# Patient Record
Sex: MALE | Race: WHITE
[De-identification: names, ages, dates, MRNs, and addresses within clinical notes are randomized per-mention and may not be internally consistent; named-entity substitution may affect disease eponyms.]

---

## 2020-11-11 ENCOUNTER — APPOINTMENT (OUTPATIENT)
Dept: UROLOGY | Facility: CLINIC | Age: 30
End: 2020-11-11

## 2021-03-08 ENCOUNTER — APPOINTMENT (OUTPATIENT)
Dept: HEMATOLOGY ONCOLOGY | Facility: CLINIC | Age: 31
End: 2021-03-08
Payer: COMMERCIAL

## 2021-03-08 ENCOUNTER — LABORATORY RESULT (OUTPATIENT)
Age: 31
End: 2021-03-08

## 2021-03-08 VITALS
HEIGHT: 66 IN | TEMPERATURE: 97.7 F | HEART RATE: 95 BPM | WEIGHT: 201 LBS | SYSTOLIC BLOOD PRESSURE: 136 MMHG | DIASTOLIC BLOOD PRESSURE: 84 MMHG | BODY MASS INDEX: 32.3 KG/M2

## 2021-03-08 DIAGNOSIS — K90.0 CELIAC DISEASE: ICD-10-CM

## 2021-03-08 DIAGNOSIS — Z87.19 PERSONAL HISTORY OF OTHER DISEASES OF THE DIGESTIVE SYSTEM: ICD-10-CM

## 2021-03-08 DIAGNOSIS — Z78.9 OTHER SPECIFIED HEALTH STATUS: ICD-10-CM

## 2021-03-08 DIAGNOSIS — Z80.43 FAMILY HISTORY OF MALIGNANT NEOPLASM OF TESTIS: ICD-10-CM

## 2021-03-08 PROCEDURE — 99204 OFFICE O/P NEW MOD 45 MIN: CPT

## 2021-03-09 ENCOUNTER — LABORATORY RESULT (OUTPATIENT)
Age: 31
End: 2021-03-09

## 2021-03-09 PROBLEM — Z80.43 FAMILY HISTORY OF MALIGNANT NEOPLASM OF TESTIS: Status: ACTIVE | Noted: 2021-03-09

## 2021-03-09 PROBLEM — Z78.9 NON-SMOKER: Status: ACTIVE | Noted: 2021-03-09

## 2021-03-09 PROBLEM — Z87.19 HISTORY OF GASTROESOPHAGEAL REFLUX (GERD): Status: RESOLVED | Noted: 2021-03-09 | Resolved: 2021-03-09

## 2021-03-09 PROBLEM — K90.0 ADULT CELIAC DISEASE: Status: RESOLVED | Noted: 2021-03-09 | Resolved: 2021-03-09

## 2021-03-09 LAB
HCT VFR BLD CALC: 52 %
HGB BLD-MCNC: 17.6 G/DL
LDH SERPL-CCNC: 194 U/L
MCHC RBC-ENTMCNC: 26.7 PG
MCHC RBC-ENTMCNC: 33.8 G/DL
MCV RBC AUTO: 78.8 FL
PLATELET # BLD AUTO: 266 K/UL
PMV BLD: 8.9 FL
RBC # BLD: 6.6 M/UL
RBC # FLD: 12.6 %
WBC # FLD AUTO: 6.89 K/UL

## 2021-03-09 RX ORDER — MELOXICAM 7.5 MG/1
7.5 TABLET ORAL
Qty: 15 | Refills: 0 | Status: DISCONTINUED | COMMUNITY
Start: 2020-10-15

## 2021-03-09 RX ORDER — OMEPRAZOLE 40 MG/1
40 CAPSULE, DELAYED RELEASE ORAL
Qty: 30 | Refills: 0 | Status: ACTIVE | COMMUNITY
Start: 2020-11-13

## 2021-03-09 RX ORDER — LEVOFLOXACIN 500 MG/1
500 TABLET, FILM COATED ORAL
Qty: 21 | Refills: 0 | Status: DISCONTINUED | COMMUNITY
Start: 2020-11-17

## 2021-03-09 RX ORDER — DOXYCYCLINE HYCLATE 100 MG/1
100 CAPSULE ORAL
Qty: 42 | Refills: 0 | Status: DISCONTINUED | COMMUNITY
Start: 2020-10-15

## 2021-03-11 LAB — EPO SERPL-MCNC: 11.9 MIU/ML

## 2021-03-11 NOTE — REASON FOR VISIT
[Initial Consultation] : an initial consultation for [FreeTextEntry2] : Referred for evaluation of erythrocytosis

## 2021-03-11 NOTE — ASSESSMENT
[FreeTextEntry1] : 30 y o m presented for evaluation of erythrocytosis .\par \par #Erythrocytosis with blood work showing Hb of 17 and HCT of 51\par \par I have prepared the note after I reviewed the previous notes, reviewed the laboratory studies done prior to pt's visit  today and have communicated those to the patient.\par \par \par --Discussed with pt in detail about differential and causes of erythrocytosis . He was told that we will repeat blood work today.\par --will get cbc , ldh .\par --will also r/o MPN such as polycythemia vera , and will check for JAK2 , CALR and MPL.\par --he was told to get evaluation by pulmonary and to get sleep studies as DONNA is one of the reasons for secondary polycythemia .\par --he is recommended to f/u with [PMD for health care maintenance .\par --RTC in 3 wks to discuss results .\par \par Pt was seen and examined with Dr Wilson who agrees with plan of care .

## 2021-03-11 NOTE — CONSULT LETTER
[Dear  ___] : Dear  [unfilled], [Consult Letter:] : I had the pleasure of evaluating your patient, [unfilled]. [Please see my note below.] : Please see my note below. [Consult Closing:] : Thank you very much for allowing me to participate in the care of this patient.  If you have any questions, please do not hesitate to contact me. [Sincerely,] : Sincerely, [FreeTextEntry3] : Rose Wilson MD\par Professor [employer]\par Bill/Jurgen\par Attending Physician\par [facility] [place]\par [phone]\par \par

## 2021-03-11 NOTE — HISTORY OF PRESENT ILLNESS
[de-identified] : 30 y o M with PMH of celiac disease and gerd was referred to us by Dr Marmolejo for evaluation of erythrocytosis . Pt reports that his brother was recently diagnosed with testicular cancer and pt was very upset with his diagnosis so went to see PMD for evaluation . PMD referred pt for blood work . CBC showed Hb of 17.2 with Hct 51, rbc count 6.42 , MCV 79 , WBC 6 .42 and platelets 245 . CMP showed normal chemistry . Pt then had repeat blood work  that again demonstrated Hb of 17 with Hct of 51 . Pt denies taking any OTC supplements or hormones . Denies any family history polycythemia . \par Pt denies h/o smoking . Denies itching or pruritis \par He reports that he snores at night and his girl friend has noticed apnea episodes at night . He is very active otherwise , takes no medications at home .

## 2021-03-11 NOTE — END OF VISIT
[] : Fellow [FreeTextEntry3] : erythrocytosis likley secondary. Proceed with above w/u. Pt should get pulm eval for sleep apnea

## 2021-03-12 LAB — JAK2 GENE MUT ANL BLD/T: NORMAL

## 2021-04-05 ENCOUNTER — APPOINTMENT (OUTPATIENT)
Dept: HEMATOLOGY ONCOLOGY | Facility: CLINIC | Age: 31
End: 2021-04-05
Payer: COMMERCIAL

## 2021-04-05 ENCOUNTER — LABORATORY RESULT (OUTPATIENT)
Age: 31
End: 2021-04-05

## 2021-04-05 VITALS
BODY MASS INDEX: 32.78 KG/M2 | SYSTOLIC BLOOD PRESSURE: 135 MMHG | HEART RATE: 83 BPM | HEIGHT: 66 IN | DIASTOLIC BLOOD PRESSURE: 77 MMHG | WEIGHT: 204 LBS | TEMPERATURE: 98.7 F

## 2021-04-05 LAB
HCT VFR BLD CALC: 51.7 %
HGB BLD-MCNC: 17.3 G/DL
MCHC RBC-ENTMCNC: 26.4 PG
MCHC RBC-ENTMCNC: 33.5 G/DL
MCV RBC AUTO: 78.8 FL
PLATELET # BLD AUTO: 270 K/UL
PMV BLD: 9 FL
RBC # BLD: 6.56 M/UL
RBC # FLD: 12.7 %
WBC # FLD AUTO: 6.9 K/UL

## 2021-04-05 PROCEDURE — 99213 OFFICE O/P EST LOW 20 MIN: CPT

## 2021-04-05 NOTE — HISTORY OF PRESENT ILLNESS
[de-identified] : 4/5/21\par Pt is here for follow up.\par No headaches, no fever, no itching.\par He is here to discuss lab results [de-identified] : 30 y o M with PMH of celiac disease and gerd was referred to us by Dr Marmolejo for evaluation of erythrocytosis . Pt reports that his brother was recently diagnosed with testicular cancer and pt was very upset with his diagnosis so went to see PMD for evaluation . PMD referred pt for blood work . CBC showed Hb of 17.2 with Hct 51, rbc count 6.42 , MCV 79 , WBC 6 .42 and platelets 245 . CMP showed normal chemistry . Pt then had repeat blood work  that again demonstrated Hb of 17 with Hct of 51 . Pt denies taking any OTC supplements or hormones . Denies any family history polycythemia . \par Pt denies h/o smoking . Denies itching or pruritis \par He reports that he snores at night and his girl friend has noticed apnea episodes at night . He is very active otherwise , takes no medications at home .

## 2021-04-05 NOTE — REASON FOR VISIT
[Follow-Up Visit] : a follow-up visit for [FreeTextEntry2] : Referred for evaluation of erythrocytosis

## 2021-04-05 NOTE — CONSULT LETTER
[Dear  ___] : Dear  [unfilled], [Consult Letter:] : I had the pleasure of evaluating your patient, [unfilled]. [Please see my note below.] : Please see my note below. [Consult Closing:] : Thank you very much for allowing me to participate in the care of this patient.  If you have any questions, please do not hesitate to contact me. [Sincerely,] : Sincerely, [FreeTextEntry3] : Rose Wilson MD\par Professor [employer]\par Bill/Jurgen\par Attending Physician\par [facility] [place]\par [phone]\par \par

## 2021-04-05 NOTE — ASSESSMENT
[FreeTextEntry1] : 30 y o m presented for evaluation of erythrocytosis .\par \par #Erythrocytosis with blood work showing Hb of 17 and HCT of 52%. Pt has microcytosis\par \par \par --Discussed with pt in detail about differential and causes of erythrocytosis . He was told that we will repeat blood work today.\par -Discussed that based on lab results no e/o myeloproliferative disorder.\par  Polycythemia likely secondary. Pt advised to get eval to R/O sleep apnea.\par Will phlebotomize today to keep Hct less than 50%.\par \par I have prepared the note after I reviewed the previous notes, reviewed the laboratory studies done prior to pt's visit  today and have communicated those to the patient.\par \par \par --RTC in 3 wks to repeat CBC\par \par

## 2021-04-06 LAB
FERRITIN SERPL-MCNC: 135 NG/ML
IRON SATN MFR SERPL: 18 %
IRON SERPL-MCNC: 61 UG/DL
TIBC SERPL-MCNC: 339 UG/DL
UIBC SERPL-MCNC: 278 UG/DL

## 2021-04-09 LAB
HGB A MFR BLD: 97.6 %
HGB A2 MFR BLD: 2.4 %
HGB FRACT BLD-IMP: NORMAL

## 2021-04-26 ENCOUNTER — APPOINTMENT (OUTPATIENT)
Dept: HEMATOLOGY ONCOLOGY | Facility: CLINIC | Age: 31
End: 2021-04-26
Payer: COMMERCIAL

## 2021-04-26 ENCOUNTER — LABORATORY RESULT (OUTPATIENT)
Age: 31
End: 2021-04-26

## 2021-04-26 ENCOUNTER — APPOINTMENT (OUTPATIENT)
Dept: INFUSION THERAPY | Facility: CLINIC | Age: 31
End: 2021-04-26
Payer: COMMERCIAL

## 2021-04-26 VITALS
DIASTOLIC BLOOD PRESSURE: 88 MMHG | SYSTOLIC BLOOD PRESSURE: 130 MMHG | HEART RATE: 94 BPM | BODY MASS INDEX: 32.78 KG/M2 | WEIGHT: 204 LBS | TEMPERATURE: 98.7 F | HEIGHT: 66 IN

## 2021-04-26 DIAGNOSIS — Z00.00 ENCOUNTER FOR GENERAL ADULT MEDICAL EXAMINATION W/OUT ABNORMAL FINDINGS: ICD-10-CM

## 2021-04-26 LAB
HCT VFR BLD CALC: 48.7 %
HGB BLD-MCNC: 16.4 G/DL
MCHC RBC-ENTMCNC: 26.7 PG
MCHC RBC-ENTMCNC: 33.7 G/DL
MCV RBC AUTO: 79.3 FL
PLATELET # BLD AUTO: 277 K/UL
PMV BLD: 8.8 FL
RBC # BLD: 6.14 M/UL
RBC # FLD: 13 %
WBC # FLD AUTO: 8.04 K/UL

## 2021-04-26 PROCEDURE — 99213 OFFICE O/P EST LOW 20 MIN: CPT

## 2021-04-30 ENCOUNTER — APPOINTMENT (OUTPATIENT)
Dept: PULMONOLOGY | Facility: CLINIC | Age: 31
End: 2021-04-30
Payer: COMMERCIAL

## 2021-04-30 VITALS
HEIGHT: 66 IN | RESPIRATION RATE: 14 BRPM | SYSTOLIC BLOOD PRESSURE: 118 MMHG | BODY MASS INDEX: 32.78 KG/M2 | WEIGHT: 204 LBS | DIASTOLIC BLOOD PRESSURE: 80 MMHG | HEART RATE: 78 BPM | OXYGEN SATURATION: 98 %

## 2021-04-30 PROCEDURE — 99203 OFFICE O/P NEW LOW 30 MIN: CPT

## 2021-04-30 PROCEDURE — 99072 ADDL SUPL MATRL&STAF TM PHE: CPT

## 2021-04-30 NOTE — ASSESSMENT
[FreeTextEntry1] : 30 y o m presented for evaluation of erythrocytosis .\par \par #Erythrocytosis \par \par \par --Discussed with pt in detail about differential and causes of erythrocytosis . \par -Discussed that based on lab results no e/o myeloproliferative disorder.\par  Polycythemia likely secondary. Pt advised to get eval to R/O sleep apnea.\par CBC ok today, hold phlebotomy today\par RTC in 3 wks to repeat CBC\par Will phlebotomize only if   Hct > 50%.\par Will continue to monitor closely\par I have prepared the note after I reviewed the previous notes, reviewed the laboratory studies done prior to pt's visit  today and have communicated those to the patient.\par \par Follow up with me in 3 M\par \par \par

## 2021-04-30 NOTE — HISTORY OF PRESENT ILLNESS
[Initial Evaluation] : an initial evaluation of [Excessive Sleepiness-Day] : excessive daytime sleepiness [Witnessed Apnea] : no witnessed apnea during sleep [Witnessed Gasping] : no witnessed gasping during sleep [Snoring] : snoring [Unrefreshing Sleep] : unrefreshing sleep [Currently Experiencing] : The patient is currently experiencing symptoms. [Gradual] : gradual [Moderate] : moderate in severity [Worsening] : worsening [Sleeping on Back] : sleeping on back [Fatigue] : fatigue [Weight Gain] : weight gain [Shortness of Breath] : no shortness of breath [None] : The patient is not currently being treated for this problem

## 2021-04-30 NOTE — CONSULT LETTER
[Dear  ___] : Dear  [unfilled], [Consult Letter:] : I had the pleasure of evaluating your patient, [unfilled]. [Please see my note below.] : Please see my note below. [Consult Closing:] : Thank you very much for allowing me to participate in the care of this patient.  If you have any questions, please do not hesitate to contact me. [Sincerely,] : Sincerely, [FreeTextEntry3] : Rose Wilson MD\par Professor [employer]\par Bill/Jurgen\par Attending Physician\par [facility] [place]\par [phone]\par \par

## 2021-04-30 NOTE — HISTORY OF PRESENT ILLNESS
[de-identified] : 4/5/21\par Pt is here for follow up.\par No headaches, no fever, no itching.\par He is here to discuss lab results\par \par 4/26/21\par Pt is here for follow up.\par No new complaints. has consult with Pulmonary scheduled [de-identified] : 30 y o M with PMH of celiac disease and gerd was referred to us by Dr Marmolejo for evaluation of erythrocytosis . Pt reports that his brother was recently diagnosed with testicular cancer and pt was very upset with his diagnosis so went to see PMD for evaluation . PMD referred pt for blood work . CBC showed Hb of 17.2 with Hct 51, rbc count 6.42 , MCV 79 , WBC 6 .42 and platelets 245 . CMP showed normal chemistry . Pt then had repeat blood work  that again demonstrated Hb of 17 with Hct of 51 . Pt denies taking any OTC supplements or hormones . Denies any family history polycythemia . \par Pt denies h/o smoking . Denies itching or pruritis \par He reports that he snores at night and his girl friend has noticed apnea episodes at night . He is very active otherwise , takes no medications at home .

## 2021-05-17 ENCOUNTER — LABORATORY RESULT (OUTPATIENT)
Age: 31
End: 2021-05-17

## 2021-05-17 ENCOUNTER — OUTPATIENT (OUTPATIENT)
Dept: OUTPATIENT SERVICES | Facility: HOSPITAL | Age: 31
LOS: 1 days | Discharge: HOME | End: 2021-05-17

## 2021-05-17 ENCOUNTER — APPOINTMENT (OUTPATIENT)
Dept: HEMATOLOGY ONCOLOGY | Facility: CLINIC | Age: 31
End: 2021-05-17

## 2021-05-17 DIAGNOSIS — D75.1 SECONDARY POLYCYTHEMIA: ICD-10-CM

## 2021-05-17 LAB
HCT VFR BLD CALC: 47.7 %
HGB BLD-MCNC: 16.2 G/DL
MCHC RBC-ENTMCNC: 26.9 PG
MCHC RBC-ENTMCNC: 34 G/DL
MCV RBC AUTO: 79.2 FL
PLATELET # BLD AUTO: 309 K/UL
PMV BLD: 8.6 FL
RBC # BLD: 6.02 M/UL
RBC # FLD: 12.5 %
WBC # FLD AUTO: 8.47 K/UL

## 2021-05-26 ENCOUNTER — OUTPATIENT (OUTPATIENT)
Dept: OUTPATIENT SERVICES | Facility: HOSPITAL | Age: 31
LOS: 1 days | Discharge: HOME | End: 2021-05-26
Payer: COMMERCIAL

## 2021-05-26 PROCEDURE — 95806 SLEEP STUDY UNATT&RESP EFFT: CPT | Mod: 26

## 2021-05-27 DIAGNOSIS — G47.33 OBSTRUCTIVE SLEEP APNEA (ADULT) (PEDIATRIC): ICD-10-CM

## 2021-06-11 ENCOUNTER — APPOINTMENT (OUTPATIENT)
Age: 31
End: 2021-06-11
Payer: COMMERCIAL

## 2021-06-11 DIAGNOSIS — G47.33 OBSTRUCTIVE SLEEP APNEA (ADULT) (PEDIATRIC): ICD-10-CM

## 2021-06-11 PROCEDURE — 99212 OFFICE O/P EST SF 10 MIN: CPT | Mod: 95

## 2021-06-11 NOTE — REASON FOR VISIT
[Medical Office: ([facility])___] : at the medical office located in  [Follow-Up] : a follow-up visit [Sleep Apnea] : sleep apnea

## 2021-06-21 ENCOUNTER — OUTPATIENT (OUTPATIENT)
Dept: OUTPATIENT SERVICES | Facility: HOSPITAL | Age: 31
LOS: 1 days | Discharge: HOME | End: 2021-06-21

## 2021-06-21 ENCOUNTER — APPOINTMENT (OUTPATIENT)
Dept: HEMATOLOGY ONCOLOGY | Facility: CLINIC | Age: 31
End: 2021-06-21
Payer: COMMERCIAL

## 2021-06-21 ENCOUNTER — LABORATORY RESULT (OUTPATIENT)
Age: 31
End: 2021-06-21

## 2021-06-21 VITALS
RESPIRATION RATE: 16 BRPM | HEART RATE: 72 BPM | WEIGHT: 201 LBS | BODY MASS INDEX: 32.3 KG/M2 | SYSTOLIC BLOOD PRESSURE: 129 MMHG | HEIGHT: 66 IN | TEMPERATURE: 97.8 F | DIASTOLIC BLOOD PRESSURE: 72 MMHG

## 2021-06-21 LAB
HCT VFR BLD CALC: 49.7 %
HGB BLD-MCNC: 17.1 G/DL
MCHC RBC-ENTMCNC: 26.6 PG
MCHC RBC-ENTMCNC: 34.4 G/DL
MCV RBC AUTO: 77.4 FL
PLATELET # BLD AUTO: 256 K/UL
PMV BLD: 9.4 FL
RBC # BLD: 6.42 M/UL
RBC # FLD: 12.4 %
WBC # FLD AUTO: 8.77 K/UL

## 2021-06-21 PROCEDURE — 99213 OFFICE O/P EST LOW 20 MIN: CPT

## 2021-06-21 NOTE — HISTORY OF PRESENT ILLNESS
[de-identified] : 4/5/21\par Pt is here for follow up.\par No headaches, no fever, no itching.\par He is here to discuss lab results\par \par 4/26/21\par Pt is here for follow up.\par No new complaints. has consult with Pulmonary scheduled\par \par 6/21/21\par Pt is here for follow up.\par Was diagnosed with mild sleep apnea and was advised wt loss only at this time.\par No new symptoms reported. [de-identified] : 30 y o M with PMH of celiac disease and gerd was referred to us by Dr Marmolejo for evaluation of erythrocytosis . Pt reports that his brother was recently diagnosed with testicular cancer and pt was very upset with his diagnosis so went to see PMD for evaluation . PMD referred pt for blood work . CBC showed Hb of 17.2 with Hct 51, rbc count 6.42 , MCV 79 , WBC 6 .42 and platelets 245 . CMP showed normal chemistry . Pt then had repeat blood work  that again demonstrated Hb of 17 with Hct of 51 . Pt denies taking any OTC supplements or hormones . Denies any family history polycythemia . \par Pt denies h/o smoking . Denies itching or pruritis \par He reports that he snores at night and his girl friend has noticed apnea episodes at night . He is very active otherwise , takes no medications at home .

## 2021-06-21 NOTE — ASSESSMENT
[FreeTextEntry1] : 30 y o m presented for evaluation of erythrocytosis .\par \par #Erythrocytosis \par \par \par --Discussed with pt in detail about differential and causes of erythrocytosis . \par -Discussed that based on lab results no e/o myeloproliferative disorder.\par  Polycythemia likely secondary. \par CBC ok today, hold phlebotomy today\par RTC in 3 M with repeat CBC\par Will phlebotomize only if   Hct > 50%.\par Will continue to monitor closely\par I have prepared the note after I reviewed the previous notes, reviewed the laboratory studies done prior to pt's visit  today and have communicated those to the patient.\par \par Follow up with me in 3 M\par \par \par

## 2021-06-21 NOTE — CONSULT LETTER
[Dear  ___] : Dear  [unfilled], [Consult Letter:] : I had the pleasure of evaluating your patient, [unfilled]. [Please see my note below.] : Please see my note below. [Consult Closing:] : Thank you very much for allowing me to participate in the care of this patient.  If you have any questions, please do not hesitate to contact me. [Sincerely,] : Sincerely, [FreeTextEntry3] : Rose Wilson MD\par Professor [employer]\par Bill/Jurgen\par Attending Physician\par [facility] [place]\par [phone]\par \par

## 2021-06-22 DIAGNOSIS — D75.1 SECONDARY POLYCYTHEMIA: ICD-10-CM

## 2022-07-14 ENCOUNTER — NON-APPOINTMENT (OUTPATIENT)
Age: 32
End: 2022-07-14

## 2022-09-13 ENCOUNTER — APPOINTMENT (OUTPATIENT)
Dept: UROLOGY | Facility: CLINIC | Age: 32
End: 2022-09-13

## 2022-09-13 VITALS
SYSTOLIC BLOOD PRESSURE: 117 MMHG | WEIGHT: 201 LBS | TEMPERATURE: 98.8 F | DIASTOLIC BLOOD PRESSURE: 71 MMHG | HEART RATE: 65 BPM | OXYGEN SATURATION: 98 % | RESPIRATION RATE: 16 BRPM | HEIGHT: 66 IN | BODY MASS INDEX: 32.3 KG/M2

## 2022-09-13 PROCEDURE — 99203 OFFICE O/P NEW LOW 30 MIN: CPT

## 2022-09-15 NOTE — PHYSICAL EXAM
[General Appearance - Well Nourished] : well nourished [Normal Appearance] : normal appearance [General Appearance - In No Acute Distress] : no acute distress [Oriented To Time, Place, And Person] : oriented to person, place, and time [Affect] : the affect was normal [Abdomen Soft] : soft [Abdomen Tenderness] : non-tender [Abdomen Mass (___ Cm)] : no abdominal mass palpated [Costovertebral Angle Tenderness] : no ~M costovertebral angle tenderness [Penis Abnormality] : normal circumcised penis [Epididymis] : the epididymides were normal [Testes Tenderness] : no tenderness of the testes [Testes Mass (___cm)] : there were no testicular masses [FreeTextEntry1] : Has a family history of testicular cancer (brother)

## 2022-09-15 NOTE — ASSESSMENT
[FreeTextEntry1] : 30 y/o male presents with urinary frequency, urgency, post void dribbling, feelings of incomplete emptying at times, and nocturia. We discussed these issues in detail. I've asked him to do a voiding diary and to obtain a renal and bladder US and follow up with a flow rate and PVR which has already been scheduled for him. Based on the results of those test, we will make further decisions. It is possible that he has a large input and a large output volume that is causing some of these issues. However, we have to look at flow rate results to determine whether he has a possible stricture. I have informed him that these issues are not likely due to prostate disease at this age. He had a prostate exam with another urologist previously and so it was not repeated. All his questions were answered.

## 2022-09-15 NOTE — HISTORY OF PRESENT ILLNESS
[FreeTextEntry1] : 30 y/o male who complains of urinary frequency, urgency, incomplete emptying, and nocturia. Pt has a IPSS of 17 and his stream is pretty good overall. He generally has nocturia x1, but has it twice occasionally. He moves his bowels once daily. Pt is positive for stone disease. \par \par pmhx: elevated hematocrit, celiac disease\par \par pshx: unremarkable\par \par allergies: none known \par \par social hx: no tobacco use, drinks large quantities of caffeine, eats healthy. \par \par Lab results:\par Creatinine: 1.21\par BUN: 17\par \par

## 2022-09-20 ENCOUNTER — RESULT REVIEW (OUTPATIENT)
Age: 32
End: 2022-09-20

## 2022-09-20 ENCOUNTER — OUTPATIENT (OUTPATIENT)
Dept: OUTPATIENT SERVICES | Facility: HOSPITAL | Age: 32
LOS: 1 days | Discharge: HOME | End: 2022-09-20

## 2022-09-20 DIAGNOSIS — R39.15 URGENCY OF URINATION: ICD-10-CM

## 2022-09-20 DIAGNOSIS — R35.0 FREQUENCY OF MICTURITION: ICD-10-CM

## 2022-09-20 LAB
BILIRUB UR QL STRIP: NORMAL
COLLECTION METHOD: NORMAL
GLUCOSE UR-MCNC: NORMAL
HCG UR QL: 0.2 EU/DL
HGB UR QL STRIP.AUTO: NORMAL
KETONES UR-MCNC: NORMAL
LEUKOCYTE ESTERASE UR QL STRIP: NORMAL
NITRITE UR QL STRIP: NORMAL
PH UR STRIP: 7
PROT UR STRIP-MCNC: NORMAL
SP GR UR STRIP: 1.01

## 2022-09-20 PROCEDURE — 76770 US EXAM ABDO BACK WALL COMP: CPT | Mod: 26

## 2022-09-22 ENCOUNTER — NON-APPOINTMENT (OUTPATIENT)
Age: 32
End: 2022-09-22

## 2022-10-25 ENCOUNTER — APPOINTMENT (OUTPATIENT)
Dept: UROLOGY | Facility: CLINIC | Age: 32
End: 2022-10-25

## 2022-10-25 VITALS
DIASTOLIC BLOOD PRESSURE: 73 MMHG | OXYGEN SATURATION: 98 % | HEIGHT: 66 IN | RESPIRATION RATE: 16 BRPM | TEMPERATURE: 98.3 F | WEIGHT: 201 LBS | BODY MASS INDEX: 32.3 KG/M2 | HEART RATE: 68 BPM | SYSTOLIC BLOOD PRESSURE: 113 MMHG

## 2022-10-25 DIAGNOSIS — R39.15 URGENCY OF URINATION: ICD-10-CM

## 2022-10-25 DIAGNOSIS — R35.0 FREQUENCY OF MICTURITION: ICD-10-CM

## 2022-10-25 PROCEDURE — 51741 ELECTRO-UROFLOWMETRY FIRST: CPT

## 2022-10-25 PROCEDURE — 99214 OFFICE O/P EST MOD 30 MIN: CPT | Mod: 25

## 2022-10-25 NOTE — PHYSICAL EXAM
[General Appearance - Well Nourished] : well nourished [Normal Appearance] : normal appearance [General Appearance - In No Acute Distress] : no acute distress [Oriented To Time, Place, And Person] : oriented to person, place, and time [Affect] : the affect was normal [Abdomen Soft] : soft [Abdomen Tenderness] : non-tender [Normal Station and Gait] : the gait and station were normal for the patient's age

## 2022-10-25 NOTE — ASSESSMENT
[FreeTextEntry1] : 30 y/o male with urinary frequency, urgency and nocturia. It seems that he has a large input and a large output of volume. We had a long discussion regarding fluid intake and the effect it has on the urinary system. Recommended adjusting his fluid intake so that he has an output of roughly 2L daily and this may take some time to work on. He will work on this and if his symptoms have not improved he will return to the office. All of his questions were otherwise answered. Total time= 30 min.

## 2022-10-25 NOTE — HISTORY OF PRESENT ILLNESS
[Currently Experiencing ___] :  [unfilled] [Urinary Urgency] : urinary urgency [Urinary Frequency] : urinary frequency [Nocturia] : nocturia [FreeTextEntry1] : Mr. Hernandez is a 31 year old male with c/o urinary frequency, urgency, incomplete emptying, and nocturia x1. Pt completed a voiding diary which showed that he puts out over 3-4 L daily. \par \par RBUS done showed no hydronephrosis, stones, and prostate was normal in size. \par \par Uroflow Results: \par Voiding time: 73.8 s\par Time to peak flow: 8.4 s\par Peak flow rate: 21.9 ml/s\par Avg. flow rate: 9.4 ml/s\par Voiding volume: 460 ml\par \par PVR: 0ml\par

## 2023-02-16 ENCOUNTER — OUTPATIENT (OUTPATIENT)
Dept: OUTPATIENT SERVICES | Facility: HOSPITAL | Age: 33
LOS: 1 days | Discharge: HOME | End: 2023-02-16
Payer: COMMERCIAL

## 2023-02-16 ENCOUNTER — APPOINTMENT (OUTPATIENT)
Dept: HEMATOLOGY ONCOLOGY | Facility: CLINIC | Age: 33
End: 2023-02-16

## 2023-02-16 ENCOUNTER — LABORATORY RESULT (OUTPATIENT)
Age: 33
End: 2023-02-16

## 2023-02-16 ENCOUNTER — APPOINTMENT (OUTPATIENT)
Dept: HEMATOLOGY ONCOLOGY | Facility: CLINIC | Age: 33
End: 2023-02-16
Payer: COMMERCIAL

## 2023-02-16 VITALS
SYSTOLIC BLOOD PRESSURE: 136 MMHG | BODY MASS INDEX: 32.14 KG/M2 | HEIGHT: 66 IN | DIASTOLIC BLOOD PRESSURE: 89 MMHG | WEIGHT: 200 LBS | TEMPERATURE: 98.3 F | HEART RATE: 77 BPM

## 2023-02-16 DIAGNOSIS — D75.1 SECONDARY POLYCYTHEMIA: ICD-10-CM

## 2023-02-16 LAB
HCT VFR BLD CALC: 48.5 %
HGB BLD-MCNC: 16 G/DL
MCHC RBC-ENTMCNC: 25.4 PG
MCHC RBC-ENTMCNC: 33 G/DL
MCV RBC AUTO: 77.1 FL
PLATELET # BLD AUTO: 260 K/UL
PMV BLD: 9.9 FL
RBC # BLD: 6.29 M/UL
RBC # FLD: 13.1 %
WBC # FLD AUTO: 6.64 K/UL

## 2023-02-16 PROCEDURE — 83550 IRON BINDING TEST: CPT

## 2023-02-16 PROCEDURE — 82728 ASSAY OF FERRITIN: CPT

## 2023-02-16 PROCEDURE — 85027 COMPLETE CBC AUTOMATED: CPT

## 2023-02-16 PROCEDURE — 36415 COLL VENOUS BLD VENIPUNCTURE: CPT

## 2023-02-16 PROCEDURE — 99213 OFFICE O/P EST LOW 20 MIN: CPT

## 2023-02-16 PROCEDURE — 83540 ASSAY OF IRON: CPT

## 2023-02-16 NOTE — CONSULT LETTER
[Dear  ___] : Dear  [unfilled], [Consult Letter:] : I had the pleasure of evaluating your patient, [unfilled]. [Please see my note below.] : Please see my note below. [Consult Closing:] : Thank you very much for allowing me to participate in the care of this patient.  If you have any questions, please do not hesitate to contact me. [Sincerely,] : Sincerely, [FreeTextEntry3] : Rose Wilson MD\par Professor [employer]\par Bill/Jurgen\par Attending Physician\par [facility] [place]\par [phone]\par \par

## 2023-02-16 NOTE — ASSESSMENT
[FreeTextEntry1] : Patient is a 32 year old male presented for evaluation of erythrocytosis. Discussed with pt in detail about differential and causes of erythrocytosis. Work up (3/2021) showed no e/o myeloproliferative disorder.\par Polycythemia likely secondary. \par \par #Erythrocytosis \par Will phlebotomize if Hct >50%.\par \par RECOMMENDATION:\par Previous notes reviewed and all relevant laboratory results discussed with Dr Wilson and were communicated to the patient.\par \par -- CBC today showed Hct  48.5 %, no need for phlebotomy.\par -- Will send Ferritin and TIBC today.\par -- Continue to follow up with PCP for health maintenance.\par \par RTC in 6-7 weeks\par \par Case was seen and discussed with Dr. Wilson who agreed with assessment and plan.\par \par \par \par \par \par \par \par \par \par

## 2023-02-16 NOTE — HISTORY OF PRESENT ILLNESS
[de-identified] : 30 y o M with PMH of celiac disease and gerd was referred to us by Dr Marmolejo for evaluation of erythrocytosis . Pt reports that his brother was recently diagnosed with testicular cancer and pt was very upset with his diagnosis so went to see PMD for evaluation . PMD referred pt for blood work . CBC showed Hb of 17.2 with Hct 51, rbc count 6.42 , MCV 79 , WBC 6 .42 and platelets 245 . CMP showed normal chemistry . Pt then had repeat blood work  that again demonstrated Hb of 17 with Hct of 51 . Pt denies taking any OTC supplements or hormones . Denies any family history polycythemia . \par Pt denies h/o smoking . Denies itching or pruritis \par He reports that he snores at night and his girl friend has noticed apnea episodes at night . He is very active otherwise , takes no medications at home . [de-identified] : 4/5/21\par Pt is here for follow up.\par No headaches, no fever, no itching.\par He is here to discuss lab results\par \par 4/26/21\par Pt is here for follow up.\par No new complaints. has consult with Pulmonary scheduled\par \par 6/21/21\par Pt is here for follow up.\par Was diagnosed with mild sleep apnea and was advised wt loss only at this time.\par No new symptoms reported.\par \par 2/16/23\par Patient is here to follow up for erythrocytosis and further eval of iron deficiency from his PCP.\par He c/o headache, fatigue, light headed and dizzy x 3 months. Ferritin was done from PCP and was advised that he has low Iron levels and took Iron pills twice daily x 1 month. repeat ferritin from 2/3/23 ([facility]) improved to 26.\par Patient states that his symptoms improved but not back to his baseline yet.\par He stated that he donated blood twice last year. He denies bleeding.\par

## 2023-02-17 DIAGNOSIS — D75.1 SECONDARY POLYCYTHEMIA: ICD-10-CM

## 2023-02-17 LAB
FERRITIN SERPL-MCNC: 45 NG/ML
IRON SATN MFR SERPL: 17 %
IRON SERPL-MCNC: 60 UG/DL
TIBC SERPL-MCNC: 355 UG/DL
UIBC SERPL-MCNC: 295 UG/DL

## 2023-04-06 ENCOUNTER — APPOINTMENT (OUTPATIENT)
Dept: HEMATOLOGY ONCOLOGY | Facility: CLINIC | Age: 33
End: 2023-04-06

## 2024-02-09 ENCOUNTER — TRANSCRIPTION ENCOUNTER (OUTPATIENT)
Age: 34
End: 2024-02-09

## 2024-04-27 ENCOUNTER — EMERGENCY (EMERGENCY)
Facility: HOSPITAL | Age: 34
LOS: 0 days | Discharge: HOME | End: 2024-04-27
Attending: EMERGENCY MEDICINE
Payer: COMMERCIAL

## 2024-04-27 VITALS
SYSTOLIC BLOOD PRESSURE: 138 MMHG | HEIGHT: 66 IN | TEMPERATURE: 98 F | WEIGHT: 199.96 LBS | OXYGEN SATURATION: 100 % | RESPIRATION RATE: 18 BRPM | DIASTOLIC BLOOD PRESSURE: 88 MMHG | HEART RATE: 80 BPM

## 2024-04-27 DIAGNOSIS — Z91.018 ALLERGY TO OTHER FOODS: ICD-10-CM
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
## 2024-04-27 DIAGNOSIS — S61.211A LACERATION WITHOUT FOREIGN BODY OF LEFT INDEX FINGER WITHOUT DAMAGE TO NAIL, INITIAL ENCOUNTER: ICD-10-CM
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
## 2024-04-27 DIAGNOSIS — Z23 ENCOUNTER FOR IMMUNIZATION: ICD-10-CM

## 2024-04-27 PROCEDURE — 99284 EMERGENCY DEPT VISIT MOD MDM: CPT | Mod: 25

## 2024-04-27 PROCEDURE — 99283 EMERGENCY DEPT VISIT LOW MDM: CPT | Mod: 25

## 2024-04-27 PROCEDURE — 12001 RPR S/N/AX/GEN/TRNK 2.5CM/<: CPT

## 2024-04-27 PROCEDURE — 90471 IMMUNIZATION ADMIN: CPT

## 2024-04-27 PROCEDURE — 90715 TDAP VACCINE 7 YRS/> IM: CPT

## 2024-04-27 RX ORDER — TETANUS TOXOID, REDUCED DIPHTHERIA TOXOID AND ACELLULAR PERTUSSIS VACCINE, ADSORBED 5; 2.5; 8; 8; 2.5 [IU]/.5ML; [IU]/.5ML; UG/.5ML; UG/.5ML; UG/.5ML
0.5 SUSPENSION INTRAMUSCULAR ONCE
Refills: 0 | Status: COMPLETED | OUTPATIENT
Start: 2024-04-27 | End: 2024-04-27

## 2024-04-27 RX ADMIN — TETANUS TOXOID, REDUCED DIPHTHERIA TOXOID AND ACELLULAR PERTUSSIS VACCINE, ADSORBED 0.5 MILLILITER(S): 5; 2.5; 8; 8; 2.5 SUSPENSION INTRAMUSCULAR at 19:01

## 2024-04-27 NOTE — ED PROVIDER NOTE - PROVIDER TOKENS
FREE:[LAST:[ER or urgent care],PHONE:[(   )    -],FAX:[(   )    -],ADDRESS:[for suture removal in 10 days]]

## 2024-04-27 NOTE — ED PROVIDER NOTE - CLINICAL SUMMARY MEDICAL DECISION MAKING FREE TEXT BOX
Tetanus was updated. Wound care and repair. Pt instructed on proper wound care.  Will monitor area closely for signs of infection and will return if any redness, streaking, drainage, increased swelling, fevers or any other concerns.

## 2024-04-27 NOTE — ED PROVIDER NOTE - PHYSICAL EXAMINATION
vital signs: I have reviewed the initial vital signs  constitutional: no acute distress, normocephalic  msk: extremity good rom, no bony tenderness, no deformity, good cap refill, no tendon injury , no foreign bodies  skin: +laceration left 2nd digit 1 cm distal aspect, not involving nail plate, without any tendon injury , +bleeding from wound, no swelling or bruising  neuro: no sensory or motor deficits of extremity. no focal deficits, gait steady, AOx3

## 2024-04-27 NOTE — ED PROVIDER NOTE - OBJECTIVE STATEMENT
32 y/o male presents to the ED with laceration to left 2nd digit pta on metal binder. patient denies any distal tingling. no foreign bodies. no redness or streaking up arm. no other injuries.

## 2024-04-27 NOTE — ED PROVIDER NOTE - CARE PROVIDER_API CALL
ER or urgent care,   for suture removal in 10 days  Phone: (   )    -  Fax: (   )    -  Follow Up Time:

## 2024-04-27 NOTE — ED PROVIDER NOTE - ATTENDING APP SHARED VISIT CONTRIBUTION OF CARE
33-year-old male presents for evaluation of laceration to left index finger sustained prior to arrival on metal planter.  Patient needs tetanus.  On exam patient in NAD, AAOx3, +1 cm laceration to distal aspect of left index finger, good range of motion, sensation intact

## 2024-04-27 NOTE — ED PROVIDER NOTE - NSFOLLOWUPINSTRUCTIONS_ED_ALL_ED_FT

## 2024-04-27 NOTE — ED PROVIDER NOTE - PATIENT PORTAL LINK FT
You can access the FollowMyHealth Patient Portal offered by [facility] by registering at the following website: [email or web address]. By joining [facility]’s FollowMyHealth portal, you will also be able to view your health information using other applications (apps) compatible with our system.

## 2025-02-10 ENCOUNTER — APPOINTMENT (OUTPATIENT)
Dept: UROLOGY | Facility: CLINIC | Age: 35
End: 2025-02-10

## 2025-02-10 PROCEDURE — 99214 OFFICE O/P EST MOD 30 MIN: CPT

## 2025-02-10 PROCEDURE — G2211 COMPLEX E/M VISIT ADD ON: CPT | Mod: NC

## 2025-02-24 PROBLEM — Z30.09 FAMILY PLANNING COUNSELING: Status: ACTIVE | Noted: 2025-02-24

## 2025-03-26 ENCOUNTER — APPOINTMENT (OUTPATIENT)
Dept: UROLOGY | Facility: CLINIC | Age: 35
End: 2025-03-26
Payer: COMMERCIAL

## 2025-03-26 DIAGNOSIS — Z30.09 ENCOUNTER FOR OTHER GENERAL COUNSELING AND ADVICE ON CONTRACEPTION: ICD-10-CM

## 2025-03-26 PROCEDURE — 99213 OFFICE O/P EST LOW 20 MIN: CPT

## 2025-03-26 RX ORDER — CEFUROXIME AXETIL 500 MG/1
500 TABLET, FILM COATED ORAL
Qty: 14 | Refills: 0 | Status: ACTIVE | COMMUNITY
Start: 2025-03-26 | End: 1900-01-01

## 2025-03-26 RX ORDER — CELECOXIB 200 MG/1
200 CAPSULE ORAL
Qty: 14 | Refills: 1 | Status: ACTIVE | COMMUNITY
Start: 2025-03-26 | End: 1900-01-01

## 2025-04-11 PROBLEM — Z78.9 OTHER SPECIFIED HEALTH STATUS: Chronic | Status: ACTIVE | Noted: 2024-04-27

## 2025-05-27 ENCOUNTER — OUTPATIENT (OUTPATIENT)
Dept: OUTPATIENT SERVICES | Facility: HOSPITAL | Age: 35
LOS: 1 days | End: 2025-05-27
Payer: COMMERCIAL

## 2025-05-27 VITALS
SYSTOLIC BLOOD PRESSURE: 124 MMHG | OXYGEN SATURATION: 100 % | WEIGHT: 188.05 LBS | TEMPERATURE: 98 F | DIASTOLIC BLOOD PRESSURE: 80 MMHG | HEIGHT: 66 IN | HEART RATE: 90 BPM | RESPIRATION RATE: 18 BRPM

## 2025-05-27 DIAGNOSIS — Z30.09 ENCOUNTER FOR OTHER GENERAL COUNSELING AND ADVICE ON CONTRACEPTION: ICD-10-CM

## 2025-05-27 DIAGNOSIS — Z01.818 ENCOUNTER FOR OTHER PREPROCEDURAL EXAMINATION: ICD-10-CM

## 2025-05-27 LAB
APPEARANCE UR: CLEAR — SIGNIFICANT CHANGE UP
BILIRUB UR-MCNC: NEGATIVE — SIGNIFICANT CHANGE UP
COLOR SPEC: YELLOW — SIGNIFICANT CHANGE UP
DIFF PNL FLD: NEGATIVE — SIGNIFICANT CHANGE UP
GLUCOSE UR QL: NEGATIVE MG/DL — SIGNIFICANT CHANGE UP
KETONES UR QL: NEGATIVE MG/DL — SIGNIFICANT CHANGE UP
LEUKOCYTE ESTERASE UR-ACNC: NEGATIVE — SIGNIFICANT CHANGE UP
NITRITE UR-MCNC: NEGATIVE — SIGNIFICANT CHANGE UP
PH UR: 6.5 — SIGNIFICANT CHANGE UP (ref 5–8)
PROT UR-MCNC: NEGATIVE MG/DL — SIGNIFICANT CHANGE UP
SP GR SPEC: 1.01 — SIGNIFICANT CHANGE UP (ref 1–1.03)
UROBILINOGEN FLD QL: 0.2 MG/DL — SIGNIFICANT CHANGE UP (ref 0.2–1)

## 2025-05-27 PROCEDURE — 99214 OFFICE O/P EST MOD 30 MIN: CPT | Mod: 25

## 2025-05-27 PROCEDURE — 87086 URINE CULTURE/COLONY COUNT: CPT

## 2025-05-27 PROCEDURE — 81003 URINALYSIS AUTO W/O SCOPE: CPT

## 2025-05-27 NOTE — H&P PST ADULT - HISTORY OF PRESENT ILLNESS
pt for elective sterilization     PATIENT/GUARDIAN CURRENTLY DENIES CHEST PAIN  SHORTNESS OF BREATH  PALPITATIONS,  DYSURIA, OR UPPER RESPIRATORY INFECTION IN PAST 2 WEEKS  denies travel outside the [place] in the past 30 days    Anesthesia Alert  NO--Difficult Airway  NO--History of neck surgery or radiation  NO--Limited ROM of neck  NO--History of Malignant hyperthermia  NO--No personal or family history of Pseudocholinesterase deficiency.  NO--Prior Anesthesia Complication  NO--Latex Allergy  NO--Loose teeth  NO--History of Rheumatoid Arthritis  NO--Bleeding risk  NO--DONNA  NO--Other_____    PT/GUARDIAN DENIES ANY RASHES, ABRASION, OR OPEN WOUNDS OR CUTS    AS PER THE PT/GUARDIAN, THIS IS HIS/HER COMPLETE MEDICAL AND SURGICAL HX, INCLUDING MEDICATIONS PRESCRIBED AND OVER THE COUNTER    Patient/guardian understands the instructions and was given the opportunity to ask questions and have them answered.    pt/guardian denies any suicidal ideation or thoughts, pt states not a threat to self or others      D[facility] Activity Status Index (DASI)  58.2 points  The higher the score (maximum 58.2), the higher the functional status.  9.89 METs    Revised Cardiac Risk Index for Pre-Operative Risk  0 points  RCRI Score  3.9 %  Risk of major cardiac event

## 2025-05-27 NOTE — H&P PST ADULT - REASON FOR ADMISSION
Patient is a 34   year old  male presenting to PAST in preparation for  BILATERAL VASECTOMY  on 6/9/25   under general anesthesia by Dr. melissa

## 2025-05-28 DIAGNOSIS — Z01.818 ENCOUNTER FOR OTHER PREPROCEDURAL EXAMINATION: ICD-10-CM

## 2025-05-28 DIAGNOSIS — Z30.09 ENCOUNTER FOR OTHER GENERAL COUNSELING AND ADVICE ON CONTRACEPTION: ICD-10-CM

## 2025-05-28 PROBLEM — Z78.9 OTHER SPECIFIED HEALTH STATUS: Chronic | Status: INACTIVE | Noted: 2024-04-27 | Resolved: 2025-05-27

## 2025-05-28 LAB
CULTURE RESULTS: NO GROWTH — SIGNIFICANT CHANGE UP
SPECIMEN SOURCE: SIGNIFICANT CHANGE UP

## 2025-06-09 ENCOUNTER — TRANSCRIPTION ENCOUNTER (OUTPATIENT)
Age: 35
End: 2025-06-09

## 2025-06-09 ENCOUNTER — APPOINTMENT (OUTPATIENT)
Dept: UROLOGY | Facility: HOSPITAL | Age: 35
End: 2025-06-09

## 2025-06-09 ENCOUNTER — RESULT REVIEW (OUTPATIENT)
Age: 35
End: 2025-06-09

## 2025-06-09 ENCOUNTER — OUTPATIENT (OUTPATIENT)
Dept: OUTPATIENT SERVICES | Facility: HOSPITAL | Age: 35
LOS: 1 days | Discharge: HOME | End: 2025-06-09
Payer: COMMERCIAL

## 2025-06-09 ENCOUNTER — NON-APPOINTMENT (OUTPATIENT)
Age: 35
End: 2025-06-09

## 2025-06-09 VITALS
TEMPERATURE: 99 F | HEIGHT: 66 IN | RESPIRATION RATE: 17 BRPM | DIASTOLIC BLOOD PRESSURE: 77 MMHG | OXYGEN SATURATION: 100 % | HEART RATE: 78 BPM | SYSTOLIC BLOOD PRESSURE: 136 MMHG | WEIGHT: 186.07 LBS

## 2025-06-09 VITALS — HEART RATE: 60 BPM | SYSTOLIC BLOOD PRESSURE: 117 MMHG | DIASTOLIC BLOOD PRESSURE: 71 MMHG | RESPIRATION RATE: 18 BRPM

## 2025-06-09 DIAGNOSIS — Z30.09 ENCOUNTER FOR OTHER GENERAL COUNSELING AND ADVICE ON CONTRACEPTION: ICD-10-CM

## 2025-06-09 PROCEDURE — 88302 TISSUE EXAM BY PATHOLOGIST: CPT | Mod: 26

## 2025-06-09 PROCEDURE — 55250 REMOVAL OF SPERM DUCT(S): CPT

## 2025-06-09 PROCEDURE — 88302 TISSUE EXAM BY PATHOLOGIST: CPT

## 2025-06-09 RX ORDER — HYDROMORPHONE/SOD CHLOR,ISO/PF 2 MG/10 ML
0.5 SYRINGE (ML) INJECTION
Refills: 0 | Status: DISCONTINUED | OUTPATIENT
Start: 2025-06-09 | End: 2025-06-09

## 2025-06-09 RX ORDER — HYDROMORPHONE/SOD CHLOR,ISO/PF 2 MG/10 ML
1 SYRINGE (ML) INJECTION
Refills: 0 | Status: DISCONTINUED | OUTPATIENT
Start: 2025-06-09 | End: 2025-06-09

## 2025-06-09 RX ORDER — SODIUM CHLORIDE 9 G/1000ML
1000 INJECTION, SOLUTION INTRAVENOUS
Refills: 0 | Status: DISCONTINUED | OUTPATIENT
Start: 2025-06-09 | End: 2025-06-09

## 2025-06-09 RX ORDER — AMOXICILLIN 500 MG/1
1 CAPSULE ORAL
Refills: 0 | DISCHARGE
End: 2025-05-29

## 2025-06-09 RX ORDER — ONDANSETRON HCL/PF 4 MG/2 ML
4 VIAL (ML) INJECTION ONCE
Refills: 0 | Status: COMPLETED | OUTPATIENT
Start: 2025-06-09 | End: 2025-06-09

## 2025-06-09 RX ADMIN — SODIUM CHLORIDE 100 MILLILITER(S): 9 INJECTION, SOLUTION INTRAVENOUS at 16:40

## 2025-06-09 RX ADMIN — Medication 4 MILLIGRAM(S): at 16:45

## 2025-06-09 NOTE — ASU DISCHARGE PLAN (ADULT/PEDIATRIC) - ASU DC SPECIAL INSTRUCTIONSFT
expect some swelling /redness  expect some pain   call office tomorrow for appt in 2 weeks   follow instruction sheet

## 2025-06-09 NOTE — ASU PATIENT PROFILE, ADULT - NS PRO PASSIVE SMOKE EXP
To whom it may concern;          This is to certify that Filomena Blevins has been seen in the Urgent Care on 12/11/2019, please excuse her from work through 12/13/2019 because of acute illness .                Debra GUZMAN,RN    Dept: [phone]   
No

## 2025-06-09 NOTE — PRE-ANESTHESIA EVALUATION ADULT - TEMPERATURE IN CELSIUS (DEGREES C)
Celeste Baig    Your results were negative.    The results are attached for your review.       Donell Rubio PA-C  
37.1

## 2025-06-09 NOTE — ASU PATIENT PROFILE, ADULT - FALL HARM RISK - HARM RISK INTERVENTIONS

## 2025-06-09 NOTE — ASU DISCHARGE PLAN (ADULT/PEDIATRIC) - FINANCIAL ASSISTANCE
[facility] provides services at a reduced cost to those who are determined to be eligible through [facility]’s financial assistance program. Information regarding [facility]’s financial assistance program can be found by going to [email or web address] or by calling [phone].

## 2025-06-09 NOTE — ASU PREOP CHECKLIST - RESPIRATORY RATE (BREATHS/MIN)
Note to MD on sunday:  Patient anticipated for discharge.  Discharge papers prepared. If patient is going to group home, then meds are already sent to specialty Rx in [place]. if patient refuses to go to group home, then meds has to be sent to patients pharmacy. 17

## 2025-06-09 NOTE — CHART NOTE - NSCHARTNOTEFT_GEN_A_CORE
PACU ANESTHESIA ADMISSION NOTE      Procedure: Bilateral vasectomy  Post op diagnosis:  Admission for sterilization    __x__  Patent Airway    __x__  Full return of protective reflexes    __x__  Full recovery from anesthesia / back to baseline status    Vitals:  T(C): 37.1 (06-09-25 @ 15:12), Max: 37.1 (06-09-25 @ 12:11)  HR: 78 (06-09-25 @ 15:12) (78 - 78)  BP: 136/77 (06-09-25 @ 15:12) (136/77 - 136/77)  RR: 17 (06-09-25 @ 15:12) (17 - 17)  SpO2: 100% (06-09-25 @ 15:12) (100% - 100%)    Mental Status:  __x__ Awake   ___x__ Alert   _____ Drowsy   _____ Sedated    Nausea/Vomiting:  __x__ NO  ______Yes,   See Post - Op Orders          Pain Scale (0-10):  ___0__    Treatment: ____ None    __x__ See Post - Op/PCA Orders    Post - Operative Fluids:   ____ Oral   __x__ See Post - Op Orders    Plan: Discharge:   __x__Home       _____Floor     _____Critical Care    _____  Other:_________________    Comments: Patient had smooth intraoperative event, no anesthesia complication.  PACU Vital signs: HR: 68            BP:        135/68          RR: 16            O2 Sat:       100%

## 2025-06-10 ENCOUNTER — NON-APPOINTMENT (OUTPATIENT)
Age: 35
End: 2025-06-10

## 2025-06-10 PROBLEM — K90.0 CELIAC DISEASE: Chronic | Status: ACTIVE | Noted: 2025-06-09

## 2025-06-10 PROBLEM — Z87.898 PERSONAL HISTORY OF OTHER SPECIFIED CONDITIONS: Chronic | Status: ACTIVE | Noted: 2025-05-27

## 2025-06-11 DIAGNOSIS — Z30.2 ENCOUNTER FOR STERILIZATION: ICD-10-CM

## 2025-06-11 DIAGNOSIS — K90.0 CELIAC DISEASE: ICD-10-CM

## 2025-06-11 LAB — SURGICAL PATHOLOGY STUDY: SIGNIFICANT CHANGE UP

## 2025-06-20 PROBLEM — J30.2 OTHER SEASONAL ALLERGIC RHINITIS: Chronic | Status: ACTIVE | Noted: 2025-05-27

## 2025-06-26 ENCOUNTER — RESULT CHARGE (OUTPATIENT)
Age: 35
End: 2025-06-26

## 2025-06-26 ENCOUNTER — APPOINTMENT (OUTPATIENT)
Dept: UROLOGY | Facility: CLINIC | Age: 35
End: 2025-06-26
Payer: COMMERCIAL

## 2025-06-26 VITALS
WEIGHT: 185 LBS | OXYGEN SATURATION: 98 % | DIASTOLIC BLOOD PRESSURE: 80 MMHG | TEMPERATURE: 99.1 F | BODY MASS INDEX: 29.73 KG/M2 | RESPIRATION RATE: 18 BRPM | HEART RATE: 68 BPM | HEIGHT: 66 IN | SYSTOLIC BLOOD PRESSURE: 125 MMHG

## 2025-06-26 PROCEDURE — 99024 POSTOP FOLLOW-UP VISIT: CPT
